# Patient Record
(demographics unavailable — no encounter records)

---

## 2025-03-12 NOTE — PHYSICAL EXAM
[Normocephalic] : normocephalic [Atraumatic] : atraumatic [Supple] : supple [No Supraclavicular Adenopathy] : no supraclavicular adenopathy [Symmetrical] : symmetrical [Grade 2] : Ptosis Grade 2 [No dominant masses] : no dominant masses in right breast  [No dominant masses] : no dominant masses left breast [No Nipple Retraction] : no left nipple retraction [No Nipple Discharge] : no left nipple discharge [No Axillary Lymphadenopathy] : no left axillary lymphadenopathy [No Edema] : no edema [de-identified] : Inframammary incision well-healed.

## 2025-03-12 NOTE — PAST MEDICAL HISTORY
[Menstruating] : The patient is menstruating [Menarche Age ____] : age at menarche was [unfilled] [Definite ___ (Date)] : the last menstrual period was [unfilled] [Total Preg ___] : G[unfilled] [Live Births ___] : P[unfilled]  [Living ___] : Living: [unfilled] [Age At Live Birth ___] : Age at live birth: [unfilled] [History of Hormone Replacement Treatment] : has no history of hormone replacement treatment [FreeTextEntry6] : No. [FreeTextEntry7] : Yes, for 6 years. [FreeTextEntry8] : Yes.

## 2025-03-12 NOTE — REVIEW OF SYSTEMS
[Negative] : Heme/Lymph [Breast Pain] : no breast pain [Breast Lump] : no breast lump [Nipple Discharge] : no nipple discharge [de-identified] : Patient reports having a lateral scar

## 2025-03-12 NOTE — PHYSICAL EXAM
[Normocephalic] : normocephalic [Atraumatic] : atraumatic [Supple] : supple [No Supraclavicular Adenopathy] : no supraclavicular adenopathy [Symmetrical] : symmetrical [Grade 2] : Ptosis Grade 2 [No dominant masses] : no dominant masses in right breast  [No dominant masses] : no dominant masses left breast [No Nipple Retraction] : no left nipple retraction [No Nipple Discharge] : no left nipple discharge [No Axillary Lymphadenopathy] : no left axillary lymphadenopathy [No Edema] : no edema [de-identified] : Inframammary incision well-healed.

## 2025-03-12 NOTE — HISTORY OF PRESENT ILLNESS
[FreeTextEntry1] : Problem List: 1. 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 Family history of breast cancer 3. Elevated Tyrer-Cuzick 47.6% 4. Dense breasts 5. Left breast 9:00 3 cm FN fibrocystic changes - Biopsy proven. CONCORDANT   CARE TEAM PCP - Bush[facility] VIRK - Or  INTERVAL HISTORY: (23): 51 year old female presents for post-op followup. She denies breast complaints and is healing well. She met with the genetic counselors and decided that her mother would undergo the additional testing first, and if positive would likely pursue testing herself.   (10/11/23): Patient presents for 6 month follow up. Denies breast complaints. Her mother is planning to do additional genetic testing but has not done it yet.   (24): Patient presents for follow up. Denies breast complaints.   (25): Patient presents for 6 month follow up. Denies breast complaints.    HPI:  - Patient is a 51 year old female who presents for an initial consultation for right breast radial scar.  She went for routine screening mammogram on 2023 which demonstrated a new area of calcifications in the right breast. Diagnostic imaging and biopsy revealing radial scar with fibrocystic changes.   BREAST HISTORY: She denies breast complaints. She does do SBE. This was her first biopsy. She has been up to date on her mammograms every year.   Tyrer-Cuzick 47.6% h/o DVT 2021 (not currently on eliquis) No asa NKDA, no latex allergy Fam hx-Mother breast cancer at 32, MGM breast at 58  IMAGIN23 - [facility]: Bilateral Screening Mammogram Density C. Right breast lower inner there is a new grouped calcifications for which magnification views are recommended. On US, scattered cysts bilaterally. - IMPRESSION: Right magnification views recommended for further evaluation. - BIRADS 0: Incomplete. Needs additional imaging evaluation.  [facility]: 2 mm grouping of heterogenous calcifications in the lower inner breast. BIRADS 4.   [facility]: Cork clip at biopsy site. Pathology reveals. RADIAL SCAR WITH USUAL DUCTAL HYPERPLASIA,  FIBROCYSTIC CHANGE WITH ASSOCIATED MICROCALCIFICATION, COLUMNAR CELL CHANGE AND PAPILLARY APOCRINE METAPLASIA; Concordant   23 - [facility]: Mammographically Guided Breast Needle Localization - PATHOLOGY: Pathology from right breast 5:00 axis needle localized excision = Fibrocystic changes including fibrosis, cyst formation, moderate intraductal hyperplasia without atypia, adenosis, apocrine metaplasia. Associated calcifications present. Biopsy site changes. Margins are evaluated and are benign.  23: [facility]: MRI Breast: Impression - No MRI evidence of malignancy. Right breast biopsy proven-radial scar. Continued surgical management is advised. Marked background parenchymal enhancement which significantly lowers the sensitivity of breast MRI for detection of small enhancing lesions. BI-RADS 2   24: [facility]: Screening Mammogram Bilateral and US Breast Complete: Density C, Impression - Left breast 9:00 3 cm FN 0.8 cm hypoechoic nodule area for which physician directed ultrasound is recommended. BI-RADS 0   24: [facility]: Left Breast Ultrasound Limited: Impression - At 9:00 3 cm FN is an irregular hypoechoic area measuring 8 x 6 x 8 mm, ultrasound guided biopsy is recommended. BI-RADS 4A   [facility] Left Biopsy: Ribbon clip, Impression - fibrocystic changes, no atypia or malignancy   24: [facility]: MRI Breast: Impression - No MRI evidence of malignancy. BI-RADS 2

## 2025-03-12 NOTE — ASSESSMENT
[FreeTextEntry1] : 53 year old female with a right breast radial scar seen on routine screening mammogram s/p excisional biopsy that did not demonstrate any residual radial scar. No atypia or malignancy identified. She is undergoing high-risk screening due to strong family history and elevated Tyrer-Cuzick score.  CBE benign. Due for screening MMG/US now. MRI breast October '25. Follow up in 6 months   Patient verbalized understanding for all treatment plans discussed. All of her questions were answered to the best of my ability. She was encouraged to call the office if any questions or concerns or come in sooner if needed.     PLAN 1. MMG/US bilateral March '25  2. MRI breast Oct '25 3. Follow up 6 months

## 2025-03-12 NOTE — HISTORY OF PRESENT ILLNESS
[FreeTextEntry1] : Problem List: 1. 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 Family history of breast cancer 3. Elevated Tyrer-Cuzick 47.6% 4. Dense breasts 5. Left breast 9:00 3 cm FN fibrocystic changes - Biopsy proven. CONCORDANT   CARE TEAM PCP - Bushra VIRK - Or  INTERVAL HISTORY: (23): 51 year old female presents for post-op followup. She denies breast complaints and is healing well. She met with the genetic counselors and decided that her mother would undergo the additional testing first, and if positive would likely pursue testing herself.   (10/11/23): Patient presents for 6 month follow up. Denies breast complaints. Her mother is planning to do additional genetic testing but has not done it yet.   (24): Patient presents for follow up. Denies breast complaints.   (25): Patient presents for 6 month follow up. Denies breast complaints.    HPI:  - Patient is a 51 year old female who presents for an initial consultation for right breast radial scar.  She went for routine screening mammogram on 2023 which demonstrated a new area of calcifications in the right breast. Diagnostic imaging and biopsy revealing radial scar with fibrocystic changes.   BREAST HISTORY: She denies breast complaints. She does do SBE. This was her first biopsy. She has been up to date on her mammograms every year.   Tyrer-Cuzick 47.6% h/o DVT 2021 (not currently on eliquis) No asa NKDA, no latex allergy Fam hx-Mother breast cancer at 32, MGM breast at 58  IMAGIN23 - [facility]: Bilateral Screening Mammogram Density C. Right breast lower inner there is a new grouped calcifications for which magnification views are recommended. On US, scattered cysts bilaterally. - IMPRESSION: Right magnification views recommended for further evaluation. - BIRADS 0: Incomplete. Needs additional imaging evaluation.  [facility]: 2 mm grouping of heterogenous calcifications in the lower inner breast. BIRADS 4.   [facility]: Cork clip at biopsy site. Pathology reveals. RADIAL SCAR WITH USUAL DUCTAL HYPERPLASIA,  FIBROCYSTIC CHANGE WITH ASSOCIATED MICROCALCIFICATION, COLUMNAR CELL CHANGE AND PAPILLARY APOCRINE METAPLASIA; Concordant   23 - [place]: Mammographically Guided Breast Needle Localization - PATHOLOGY: Pathology from right breast 5:00 axis needle localized excision = Fibrocystic changes including fibrosis, cyst formation, moderate intraductal hyperplasia without atypia, adenosis, apocrine metaplasia. Associated calcifications present. Biopsy site changes. Margins are evaluated and are benign.  23: [facility]: MRI Breast: Impression - No MRI evidence of malignancy. Right breast biopsy proven-radial scar. Continued surgical management is advised. Marked background parenchymal enhancement which significantly lowers the sensitivity of breast MRI for detection of small enhancing lesions. BI-RADS 2   24: [facility]: Screening Mammogram Bilateral and US Breast Complete: Density C, Impression - Left breast 9:00 3 cm FN 0.8 cm hypoechoic nodule area for which physician directed ultrasound is recommended. BI-RADS 0   24: [facility]: Left Breast Ultrasound Limited: Impression - At 9:00 3 cm FN is an irregular hypoechoic area measuring 8 x 6 x 8 mm, ultrasound guided biopsy is recommended. BI-RADS 4A   [facility] Left Biopsy: Ribbon clip, Impression - fibrocystic changes, no atypia or malignancy   24: [facility]: MRI Breast: Impression - No MRI evidence of malignancy. BI-RADS 2

## 2025-03-12 NOTE — REVIEW OF SYSTEMS
[Negative] : Heme/Lymph [Breast Pain] : no breast pain [Breast Lump] : no breast lump [Nipple Discharge] : no nipple discharge [de-identified] : Patient reports having a lateral scar